# Patient Record
Sex: FEMALE | Race: WHITE | NOT HISPANIC OR LATINO | URBAN - METROPOLITAN AREA
[De-identification: names, ages, dates, MRNs, and addresses within clinical notes are randomized per-mention and may not be internally consistent; named-entity substitution may affect disease eponyms.]

---

## 2018-12-09 ENCOUNTER — INPATIENT (INPATIENT)
Facility: HOSPITAL | Age: 53
LOS: 0 days | Discharge: HOME | DRG: 342 | End: 2018-12-10
Attending: STUDENT IN AN ORGANIZED HEALTH CARE EDUCATION/TRAINING PROGRAM | Admitting: STUDENT IN AN ORGANIZED HEALTH CARE EDUCATION/TRAINING PROGRAM
Payer: COMMERCIAL

## 2018-12-09 ENCOUNTER — TRANSCRIPTION ENCOUNTER (OUTPATIENT)
Age: 53
End: 2018-12-09

## 2018-12-09 VITALS
RESPIRATION RATE: 20 BRPM | SYSTOLIC BLOOD PRESSURE: 160 MMHG | TEMPERATURE: 98 F | DIASTOLIC BLOOD PRESSURE: 96 MMHG | HEIGHT: 67 IN | HEART RATE: 92 BPM | OXYGEN SATURATION: 95 % | WEIGHT: 139.99 LBS

## 2018-12-09 DIAGNOSIS — K37 UNSPECIFIED APPENDICITIS: ICD-10-CM

## 2018-12-09 LAB
ALBUMIN SERPL ELPH-MCNC: 4.6 G/DL — SIGNIFICANT CHANGE UP (ref 3.3–5.2)
ALP SERPL-CCNC: 68 U/L — SIGNIFICANT CHANGE UP (ref 40–120)
ALT FLD-CCNC: 23 U/L — SIGNIFICANT CHANGE UP
ANION GAP SERPL CALC-SCNC: 14 MMOL/L — SIGNIFICANT CHANGE UP (ref 5–17)
APPEARANCE UR: CLEAR — SIGNIFICANT CHANGE UP
AST SERPL-CCNC: 24 U/L — SIGNIFICANT CHANGE UP
BASOPHILS # BLD AUTO: 0 K/UL — SIGNIFICANT CHANGE UP (ref 0–0.2)
BASOPHILS NFR BLD AUTO: 0.2 % — SIGNIFICANT CHANGE UP (ref 0–2)
BILIRUB SERPL-MCNC: 0.4 MG/DL — SIGNIFICANT CHANGE UP (ref 0.4–2)
BILIRUB UR-MCNC: NEGATIVE — SIGNIFICANT CHANGE UP
BUN SERPL-MCNC: 15 MG/DL — SIGNIFICANT CHANGE UP (ref 8–20)
CALCIUM SERPL-MCNC: 9.7 MG/DL — SIGNIFICANT CHANGE UP (ref 8.6–10.2)
CHLORIDE SERPL-SCNC: 99 MMOL/L — SIGNIFICANT CHANGE UP (ref 98–107)
CO2 SERPL-SCNC: 25 MMOL/L — SIGNIFICANT CHANGE UP (ref 22–29)
COLOR SPEC: YELLOW — SIGNIFICANT CHANGE UP
CREAT SERPL-MCNC: 0.46 MG/DL — LOW (ref 0.5–1.3)
DIFF PNL FLD: NEGATIVE — SIGNIFICANT CHANGE UP
EOSINOPHIL # BLD AUTO: 0 K/UL — SIGNIFICANT CHANGE UP (ref 0–0.5)
EOSINOPHIL NFR BLD AUTO: 0 % — SIGNIFICANT CHANGE UP (ref 0–6)
GLUCOSE SERPL-MCNC: 120 MG/DL — HIGH (ref 70–115)
GLUCOSE UR QL: NEGATIVE MG/DL — SIGNIFICANT CHANGE UP
HCT VFR BLD CALC: 41.5 % — SIGNIFICANT CHANGE UP (ref 37–47)
HGB BLD-MCNC: 13.9 G/DL — SIGNIFICANT CHANGE UP (ref 12–16)
KETONES UR-MCNC: ABNORMAL
LEUKOCYTE ESTERASE UR-ACNC: ABNORMAL
LIDOCAIN IGE QN: 38 U/L — SIGNIFICANT CHANGE UP (ref 22–51)
LYMPHOCYTES # BLD AUTO: 1.4 K/UL — SIGNIFICANT CHANGE UP (ref 1–4.8)
LYMPHOCYTES # BLD AUTO: 11.8 % — LOW (ref 20–55)
MCHC RBC-ENTMCNC: 32 PG — HIGH (ref 27–31)
MCHC RBC-ENTMCNC: 33.5 G/DL — SIGNIFICANT CHANGE UP (ref 32–36)
MCV RBC AUTO: 95.6 FL — SIGNIFICANT CHANGE UP (ref 81–99)
MONOCYTES # BLD AUTO: 0.5 K/UL — SIGNIFICANT CHANGE UP (ref 0–0.8)
MONOCYTES NFR BLD AUTO: 3.8 % — SIGNIFICANT CHANGE UP (ref 3–10)
NEUTROPHILS # BLD AUTO: 10.3 K/UL — HIGH (ref 1.8–8)
NEUTROPHILS NFR BLD AUTO: 83.9 % — HIGH (ref 37–73)
NITRITE UR-MCNC: NEGATIVE — SIGNIFICANT CHANGE UP
PH UR: 7 — SIGNIFICANT CHANGE UP (ref 5–8)
PLATELET # BLD AUTO: 242 K/UL — SIGNIFICANT CHANGE UP (ref 150–400)
POTASSIUM SERPL-MCNC: 4.6 MMOL/L — SIGNIFICANT CHANGE UP (ref 3.5–5.3)
POTASSIUM SERPL-SCNC: 4.6 MMOL/L — SIGNIFICANT CHANGE UP (ref 3.5–5.3)
PROT SERPL-MCNC: 7.5 G/DL — SIGNIFICANT CHANGE UP (ref 6.6–8.7)
PROT UR-MCNC: 15 MG/DL
RBC # BLD: 4.34 M/UL — LOW (ref 4.4–5.2)
RBC # FLD: 12 % — SIGNIFICANT CHANGE UP (ref 11–15.6)
SODIUM SERPL-SCNC: 138 MMOL/L — SIGNIFICANT CHANGE UP (ref 135–145)
SP GR SPEC: 1.01 — SIGNIFICANT CHANGE UP (ref 1.01–1.02)
UROBILINOGEN FLD QL: NEGATIVE MG/DL — SIGNIFICANT CHANGE UP
WBC # BLD: 12.3 K/UL — HIGH (ref 4.8–10.8)
WBC # FLD AUTO: 12.3 K/UL — HIGH (ref 4.8–10.8)

## 2018-12-09 PROCEDURE — 76705 ECHO EXAM OF ABDOMEN: CPT | Mod: 26

## 2018-12-09 PROCEDURE — 93010 ELECTROCARDIOGRAM REPORT: CPT

## 2018-12-09 PROCEDURE — 99285 EMERGENCY DEPT VISIT HI MDM: CPT

## 2018-12-09 PROCEDURE — 74177 CT ABD & PELVIS W/CONTRAST: CPT | Mod: 26

## 2018-12-09 RX ORDER — METOCLOPRAMIDE HCL 10 MG
10 TABLET ORAL ONCE
Qty: 0 | Refills: 0 | Status: COMPLETED | OUTPATIENT
Start: 2018-12-09 | End: 2018-12-09

## 2018-12-09 RX ORDER — CEFOTETAN DISODIUM 1 G
2 VIAL (EA) INJECTION ONCE
Qty: 0 | Refills: 0 | Status: COMPLETED | OUTPATIENT
Start: 2018-12-09 | End: 2018-12-09

## 2018-12-09 RX ORDER — RIVAROXABAN 15 MG-20MG
0 KIT ORAL
Qty: 0 | Refills: 0 | COMMUNITY

## 2018-12-09 RX ORDER — ONDANSETRON 8 MG/1
4 TABLET, FILM COATED ORAL ONCE
Qty: 0 | Refills: 0 | Status: COMPLETED | OUTPATIENT
Start: 2018-12-09 | End: 2018-12-09

## 2018-12-09 RX ORDER — HYDROMORPHONE HYDROCHLORIDE 2 MG/ML
1 INJECTION INTRAMUSCULAR; INTRAVENOUS; SUBCUTANEOUS ONCE
Qty: 0 | Refills: 0 | Status: DISCONTINUED | OUTPATIENT
Start: 2018-12-09 | End: 2018-12-09

## 2018-12-09 RX ORDER — SODIUM CHLORIDE 9 MG/ML
1000 INJECTION INTRAMUSCULAR; INTRAVENOUS; SUBCUTANEOUS ONCE
Qty: 0 | Refills: 0 | Status: COMPLETED | OUTPATIENT
Start: 2018-12-09 | End: 2018-12-09

## 2018-12-09 RX ORDER — ACETAMINOPHEN 500 MG
1000 TABLET ORAL ONCE
Qty: 0 | Refills: 0 | Status: COMPLETED | OUTPATIENT
Start: 2018-12-09 | End: 2018-12-09

## 2018-12-09 RX ORDER — KETOROLAC TROMETHAMINE 30 MG/ML
15 SYRINGE (ML) INJECTION ONCE
Qty: 0 | Refills: 0 | Status: DISCONTINUED | OUTPATIENT
Start: 2018-12-09 | End: 2018-12-09

## 2018-12-09 RX ORDER — FAMOTIDINE 10 MG/ML
20 INJECTION INTRAVENOUS ONCE
Qty: 0 | Refills: 0 | Status: COMPLETED | OUTPATIENT
Start: 2018-12-09 | End: 2018-12-09

## 2018-12-09 RX ADMIN — Medication 15 MILLIGRAM(S): at 19:30

## 2018-12-09 RX ADMIN — SODIUM CHLORIDE 1000 MILLILITER(S): 9 INJECTION INTRAMUSCULAR; INTRAVENOUS; SUBCUTANEOUS at 18:02

## 2018-12-09 RX ADMIN — Medication 100 GRAM(S): at 22:51

## 2018-12-09 RX ADMIN — SODIUM CHLORIDE 1000 MILLILITER(S): 9 INJECTION INTRAMUSCULAR; INTRAVENOUS; SUBCUTANEOUS at 19:00

## 2018-12-09 RX ADMIN — HYDROMORPHONE HYDROCHLORIDE 1 MILLIGRAM(S): 2 INJECTION INTRAMUSCULAR; INTRAVENOUS; SUBCUTANEOUS at 23:21

## 2018-12-09 RX ADMIN — ONDANSETRON 4 MILLIGRAM(S): 8 TABLET, FILM COATED ORAL at 20:53

## 2018-12-09 RX ADMIN — ONDANSETRON 4 MILLIGRAM(S): 8 TABLET, FILM COATED ORAL at 18:02

## 2018-12-09 RX ADMIN — Medication 10 MILLIGRAM(S): at 19:33

## 2018-12-09 RX ADMIN — Medication 15 MILLIGRAM(S): at 22:52

## 2018-12-09 RX ADMIN — FAMOTIDINE 20 MILLIGRAM(S): 10 INJECTION INTRAVENOUS at 18:03

## 2018-12-09 RX ADMIN — Medication 400 MILLIGRAM(S): at 23:23

## 2018-12-09 NOTE — H&P ADULT - HISTORY OF PRESENT ILLNESS
54 y/o F h/o Factor V Leiden presents to the ED with abdominal pain that started today. Patient states abdominal pain started in LLQ at around 4PM and migrated to RLQ. Pain is described as severe, sharp and is associated with nausea and multiple episodes of vomiting. No fever, but has chills. No changes in bowel habits. Headache present. Decreased oral intake today. No other symptoms.    PMH: Factor V Leiden  PSH: Paula filter insertion  Medications: Xarleto  Allergies: NKDA, NKA  Social: no tobacco use. Drinks one glass of wine every night. No illicit drug use.

## 2018-12-09 NOTE — ED PROVIDER NOTE - PROGRESS NOTE DETAILS
NP NOTE:  Patient with vomiting, will rx reglan.  US negative, continued pain CT abd/pelvis w IV contrast ordered.  Report and care given to Marsha GORE. Patient states abdominal pain is improved. She is still nauseous but states it is improving slowly. CT results: Acute uncomplicated appendicitis. Spoke to surgery, will see patient in ED.

## 2018-12-09 NOTE — H&P ADULT - ASSESSMENT
54 y/o F h/o Factor V Leiden, DVTs on Xarelto with clinical and radiographic evidence of acute appendicitis. Hemodynamically stable, afebrile. Discussed the elevated bleeding risk to the patient considering her last dose of Xarelto was taken yesterday evening. The benefits of appendectomy and prevention of rupture outweigh the risk of bleeding in this case.    Plan:  Admit to ACS, Dr. Law  OR for laparoscopic, possible open, appendectomy  2 units FFP, 2 units PRBC on hold for OR  NPO/IVF  Pain control as needed  Cefotetan IV  DVT ppx    Patient seen and evaluated with chief resident Dr. New and attending Dr. Law.

## 2018-12-09 NOTE — H&P ADULT - NSHPPHYSICALEXAM_GEN_ALL_CORE
General: NAD, AOx3, uncomfortable on examination  HEENT: PERRLA, EOMI, dry mucous membranes  Neck: supple, nontender  Cardiovascular: heart RRR, no murmurs  Respiratory: no respiratory distress, CTAB  Abdomen: soft, right abdomen tenderness to palpation, nondistended. No guarding or rebound. Normal bowel sounds.  Extremities: no peripheral edema. Normal ROM.  Integumentary: warm, no rash  Neuro: no motor or sensory deficits

## 2018-12-09 NOTE — ED STATDOCS - OBJECTIVE STATEMENT
Telemedicine assessment was conducted (using real time 2 way audio-video technology) by Dr. Juan Ramon Hunt located at [address] [place] [address]  ++++++++++++++++++++++++  Pertinent patient history and initial plan:    54 y/o F pt with hx of factor 5 Leiden on Xarelto presents to ED from [facility] urgent care c/o left sided  abdominal pain associated with nausea and vomiting since yesterday. Pain described as a cramping sensation. Last bowel movement yesterday. Denies diarrhea, fevers. No further complaints at this time. Telemedicine assessment was conducted (using real time 2 way audio-video technology) by Dr. Juan Ramon Hunt located at [address] [place] [address]  ++++++++++++++++++++++++  Pertinent patient history and initial plan:    52 y/o F pt with hx of factor 5 Leiden on Xarelto presents to ED from [facility] urgent care c/o left sided  abdominal pain associated with nausea and vomiting since yesterday. Pain described as a cramping sensation. Last bowel movement yesterday. Denies diarrhea, fevers. No further complaints at this time.    labs, UA, zofran    Patient seen by me in intake for initial assessment and ordering. Physician on site to follow results and further evaluate and treat patient.

## 2018-12-09 NOTE — ED PROVIDER NOTE - OBJECTIVE STATEMENT
52 y/o F sent by  [employer] with c/o sudden onset generalized abdominal pain with N/V since 4am.  The pain is constant and is now radiating to back.  Has not taken anything for the pain. 52 y/o F sent by  [employer] with c/o sudden onset generalized abdominal pain with multiple episodes of N/V since 4am.  The pain is constant and is now radiating to back.  Has not taken anything for the pain.

## 2018-12-09 NOTE — ED PROVIDER NOTE - ATTENDING CONTRIBUTION TO CARE
53 year old c/o sudden onset nausea and vomiting with diffuse abdominal pain that is now in the RUQ radiating to the back.  Patient well appearing with RUQ tenderness on exam.  US negative for cholecystectomy.  CT + for appendicitis.  Patient given abx and admitted to ACS team.

## 2018-12-10 ENCOUNTER — TRANSCRIPTION ENCOUNTER (OUTPATIENT)
Age: 53
End: 2018-12-10

## 2018-12-10 ENCOUNTER — RESULT REVIEW (OUTPATIENT)
Age: 53
End: 2018-12-10

## 2018-12-10 VITALS
TEMPERATURE: 98 F | RESPIRATION RATE: 18 BRPM | DIASTOLIC BLOOD PRESSURE: 64 MMHG | SYSTOLIC BLOOD PRESSURE: 109 MMHG | OXYGEN SATURATION: 95 % | HEART RATE: 82 BPM

## 2018-12-10 LAB
ABO RH CONFIRMATION: SIGNIFICANT CHANGE UP
APTT BLD: 27.3 SEC — LOW (ref 27.5–36.3)
BLD GP AB SCN SERPL QL: SIGNIFICANT CHANGE UP
INR BLD: 1.01 RATIO — SIGNIFICANT CHANGE UP (ref 0.88–1.16)
PROTHROM AB SERPL-ACNC: 11.6 SEC — SIGNIFICANT CHANGE UP (ref 10–12.9)
TYPE + AB SCN PNL BLD: SIGNIFICANT CHANGE UP

## 2018-12-10 PROCEDURE — 88304 TISSUE EXAM BY PATHOLOGIST: CPT | Mod: 26

## 2018-12-10 PROCEDURE — 44970 LAPAROSCOPY APPENDECTOMY: CPT

## 2018-12-10 PROCEDURE — 71045 X-RAY EXAM CHEST 1 VIEW: CPT | Mod: 26

## 2018-12-10 RX ORDER — HYDROMORPHONE HYDROCHLORIDE 2 MG/ML
0.5 INJECTION INTRAMUSCULAR; INTRAVENOUS; SUBCUTANEOUS EVERY 4 HOURS
Qty: 0 | Refills: 0 | Status: DISCONTINUED | OUTPATIENT
Start: 2018-12-10 | End: 2018-12-10

## 2018-12-10 RX ORDER — SODIUM CHLORIDE 9 MG/ML
1000 INJECTION, SOLUTION INTRAVENOUS
Qty: 0 | Refills: 0 | Status: DISCONTINUED | OUTPATIENT
Start: 2018-12-10 | End: 2018-12-10

## 2018-12-10 RX ORDER — ENOXAPARIN SODIUM 100 MG/ML
65 INJECTION SUBCUTANEOUS ONCE
Qty: 0 | Refills: 0 | Status: COMPLETED | OUTPATIENT
Start: 2018-12-10 | End: 2018-12-10

## 2018-12-10 RX ORDER — ACETAMINOPHEN 500 MG
650 TABLET ORAL EVERY 6 HOURS
Qty: 0 | Refills: 0 | Status: DISCONTINUED | OUTPATIENT
Start: 2018-12-10 | End: 2018-12-10

## 2018-12-10 RX ORDER — TRAMADOL HYDROCHLORIDE 50 MG/1
50 TABLET ORAL EVERY 6 HOURS
Qty: 0 | Refills: 0 | Status: DISCONTINUED | OUTPATIENT
Start: 2018-12-10 | End: 2018-12-10

## 2018-12-10 RX ORDER — FENTANYL CITRATE 50 UG/ML
50 INJECTION INTRAVENOUS
Qty: 0 | Refills: 0 | Status: DISCONTINUED | OUTPATIENT
Start: 2018-12-10 | End: 2018-12-10

## 2018-12-10 RX ORDER — OXYCODONE AND ACETAMINOPHEN 5; 325 MG/1; MG/1
1 TABLET ORAL ONCE
Qty: 0 | Refills: 0 | Status: DISCONTINUED | OUTPATIENT
Start: 2018-12-10 | End: 2018-12-10

## 2018-12-10 RX ORDER — ONDANSETRON 8 MG/1
4 TABLET, FILM COATED ORAL ONCE
Qty: 0 | Refills: 0 | Status: DISCONTINUED | OUTPATIENT
Start: 2018-12-10 | End: 2018-12-10

## 2018-12-10 RX ORDER — INFLUENZA VIRUS VACCINE 15; 15; 15; 15 UG/.5ML; UG/.5ML; UG/.5ML; UG/.5ML
0.5 SUSPENSION INTRAMUSCULAR ONCE
Qty: 0 | Refills: 0 | Status: DISCONTINUED | OUTPATIENT
Start: 2018-12-10 | End: 2018-12-10

## 2018-12-10 RX ORDER — ACETAMINOPHEN 500 MG
2 TABLET ORAL
Qty: 0 | Refills: 0 | COMMUNITY
Start: 2018-12-10

## 2018-12-10 RX ORDER — ONDANSETRON 8 MG/1
4 TABLET, FILM COATED ORAL EVERY 6 HOURS
Qty: 0 | Refills: 0 | Status: DISCONTINUED | OUTPATIENT
Start: 2018-12-10 | End: 2018-12-10

## 2018-12-10 RX ORDER — TRAMADOL HYDROCHLORIDE 50 MG/1
1 TABLET ORAL
Qty: 12 | Refills: 0 | OUTPATIENT
Start: 2018-12-10 | End: 2018-12-12

## 2018-12-10 RX ORDER — TRAMADOL HYDROCHLORIDE 50 MG/1
25 TABLET ORAL EVERY 6 HOURS
Qty: 0 | Refills: 0 | Status: DISCONTINUED | OUTPATIENT
Start: 2018-12-10 | End: 2018-12-10

## 2018-12-10 RX ORDER — HYDROMORPHONE HYDROCHLORIDE 2 MG/ML
1 INJECTION INTRAMUSCULAR; INTRAVENOUS; SUBCUTANEOUS EVERY 4 HOURS
Qty: 0 | Refills: 0 | Status: DISCONTINUED | OUTPATIENT
Start: 2018-12-10 | End: 2018-12-10

## 2018-12-10 RX ADMIN — ENOXAPARIN SODIUM 65 MILLIGRAM(S): 100 INJECTION SUBCUTANEOUS at 13:50

## 2018-12-10 RX ADMIN — TRAMADOL HYDROCHLORIDE 25 MILLIGRAM(S): 50 TABLET ORAL at 13:45

## 2018-12-10 RX ADMIN — TRAMADOL HYDROCHLORIDE 25 MILLIGRAM(S): 50 TABLET ORAL at 13:11

## 2018-12-10 NOTE — BRIEF OPERATIVE NOTE - PROCEDURE
<<-----Click on this checkbox to enter Procedure Laparoscopic appendectomy  12/10/2018    Active  GALYATEEM

## 2018-12-10 NOTE — DISCHARGE NOTE ADULT - PATIENT PORTAL LINK FT
You can access the [facility], offered by [facility], by registering with the following website: [email or web address]

## 2018-12-10 NOTE — ED ADULT NURSE NOTE - NSIMPLEMENTINTERV_GEN_ALL_ED
Implemented All Fall with Harm Risk Interventions:  [place] to call system. Call bell, personal items and telephone within reach. Instruct patient to call for assistance. Room bathroom lighting operational. Non-slip footwear when patient is off stretcher. Physically safe environment: no spills, clutter or unnecessary equipment. Stretcher in lowest position, wheels locked, appropriate side rails in place. Provide visual cue, wrist band, yellow gown, etc. Monitor gait and stability. Monitor for mental status changes and reorient to person, place, and time. Review medications for side effects contributing to fall risk. Reinforce activity limits and safety measures with patient and family. Provide visual clues: red socks.

## 2018-12-10 NOTE — DISCHARGE NOTE ADULT - MEDICATION SUMMARY - MEDICATIONS TO TAKE
I will START or STAY ON the medications listed below when I get home from the hospital:    acetaminophen 325 mg oral tablet  -- 2 tab(s) by mouth every 6 hours, As needed, Mild Pain (1 - 3)  -- Indication: For Pain    traMADol 50 mg oral tablet  -- 1 tab(s) by mouth every 6 hours, As Needed -for severe pain MDD:4  -- Caution federal law prohibits the transfer of this drug to any person other  than the person for whom it was prescribed.  May cause drowsiness.  Alcohol may intensify this effect.  Use care when operating dangerous machinery.  Obtain medical advice before taking any non-prescription drugs as some may affect the action of this medication.    -- Indication: For Pain    Xarelto  -- Indication: For Factor V Leiden

## 2018-12-10 NOTE — PROGRESS NOTE ADULT - SUBJECTIVE AND OBJECTIVE BOX
INTERVAL HPI/OVERNIGHT EVENTS: Doing well post-operatively. Pain controlled. No fever, chills, chest pain, or dyspnea. Tolerating diet without nausea or vomiting. No bleeding from incision sites.    MEDICATIONS  (STANDING):  enoxaparin Injectable 65 milliGRAM(s) SubCutaneous once  influenza   Vaccine 0.5 milliLiter(s) IntraMuscular once    MEDICATIONS  (PRN):  acetaminophen   Tablet .. 650 milliGRAM(s) Oral every 6 hours PRN Mild Pain (1 - 3)  ondansetron Injectable 4 milliGRAM(s) IV Push every 6 hours PRN Nausea  traMADol 25 milliGRAM(s) Oral every 6 hours PRN Moderate Pain (4 - 6)  traMADol 50 milliGRAM(s) Oral every 6 hours PRN Severe Pain (7 - 10)      Vital Signs Last 24 Hrs  T(C): 36.8 (10 Dec 2018 08:40), Max: 36.9 (09 Dec 2018 23:26)  T(F): 98.3 (10 Dec 2018 08:40), Max: 98.4 (09 Dec 2018 23:26)  HR: 82 (10 Dec 2018 08:40) (80 - 95)  BP: 109/64 (10 Dec 2018 08:40) (109/64 - 160/96)  BP(mean): --  RR: 18 (10 Dec 2018 08:40) (10 - 20)  SpO2: 95% (10 Dec 2018 08:40) (94% - 100%)    Physical exam:  General: NAD, AOx3, resting comfortably in bed  HEENT: PERRLA, EOMI  Neck: supple, nontender  Respiratory: no respiratory distress, lungs CTAB  Heart: regular rate and rhythm, no murmurs  Abdomen: soft, appropriately tender, nondistended. Normal bowel sounds. No guarding or rebound. Incision sites clean, dry, intact. No bleeding from incisions. Mild ecchymosis at umbilical port site.  Extremities: no peripheral edema. Normal ROM.    I&O's Detail    10 Dec 2018 07:01  -  10 Dec 2018 09:36  --------------------------------------------------------  IN:    Oral Fluid: 475 mL  Total IN: 475 mL    OUT:  Total OUT: 0 mL    Total NET: 475 mL

## 2018-12-10 NOTE — PROGRESS NOTE ADULT - ATTENDING COMMENTS
Tolerating diet. Pain controlled. Voiding.  Abdominal exam with mild yomaira-incisional tenderness.  Plan for dose of lovenox at noon. Patient to resume her xarelto this evening.

## 2018-12-10 NOTE — DISCHARGE NOTE ADULT - CARE PROVIDER_API CALL
[facility],   [facility] [address] - [address] [place] [address]  Phone: [phone]  Fax: (   )    -

## 2018-12-10 NOTE — DISCHARGE NOTE ADULT - PLAN OF CARE
Alleviation of pain and symptoms Follow up: Please call and make an appointment with the Acute Care Surgery Clinic 10-14 days after discharge. Also, please call and make an appointment with your primary care physician as per your usual schedule.     Activity: May return to normal activities as tolerated, however refrain from heavy lifting > 10-15 lbs.    Diet: May continue regular diet.    Medications: Please take all home medications as prescribed by your primary care doctor. Pain medication has been prescribed for you. Please, take it as it has been prescribed, only for severe breakthrough pain, and do not drive or operate heavy machinery while taking narcotics.     Wound Care: Please, keep wound site clean and dry. You may shower, but do not bathe.    Patient is advised to RETURN TO THE EMERGENCY DEPARTMENT for any of the following - worsening pain, fever/chills, nausea/vomiting, altered mental status, chest pain, shortness of breath, or any other new / worsening symptom. Please resume home medications and follow-up with your hematologist / primary care provider as per your usual schedule. Please resume xarelto this evening and follow-up with your hematologist / primary care provider as per your usual schedule.

## 2018-12-10 NOTE — DISCHARGE NOTE ADULT - HOSPITAL COURSE
52 y/o F h/o Factor V Leiden presents to the ED with abdominal pain that started today. Patient states abdominal pain started in LLQ at around 4PM and migrated to RLQ. Pain is described as severe, sharp and is associated with nausea and multiple episodes of vomiting. No fever, but has chills. No changes in bowel habits. Headache present. Decreased oral intake today. No other symptoms.    Hospital Course: CT abd & pelvis on admission showed acute uncomplicated appendicitis. Patient was admitted to the ACS service & taken to the OR on 12/10 for a laparoscopic appendectomy. Procedure completed without complication & patient transferred to PACU and then the floor in stable condition. Post-op patient is hemodynamically well, tolerating diet, pain controlled, OOB ambulating, voiding. Stable for discharge home with outpatient follow-up as outlined above.    Patient is advised to RETURN TO THE EMERGENCY DEPARTMENT for any of the following - worsening pain, fever/chills, nausea/vomiting, altered mental status, chest pain, shortness of breath, or any other new / worsening symptom.

## 2018-12-10 NOTE — ED ADULT NURSE REASSESSMENT NOTE - NS ED NURSE REASSESS COMMENT FT1
patient resting awaitng on OR patient aware of NPO status pre-op check list started sl to right hand #20 intact and flushes well willmonitor andd chart changes

## 2018-12-10 NOTE — BRIEF OPERATIVE NOTE - PRE-OP DX
Acute appendicitis with localized peritonitis, without perforation, abscess, or gangrene  12/10/2018    Active  Quentin Azevedo

## 2018-12-10 NOTE — DISCHARGE NOTE ADULT - CARE PLAN
Principal Discharge DX:	Appendicitis  Goal:	Alleviation of pain and symptoms  Assessment and plan of treatment:	Follow up: Please call and make an appointment with the Acute [facility] Surgery Clinic 10-14 days after discharge. Also, please call and make an appointment with your primary care physician as per your usual schedule.     Activity: May return to normal activities as tolerated, however refrain from heavy lifting > 10-15 lbs.    Diet: May continue regular diet.    Medications: Please take all home medications as prescribed by your primary care doctor. Pain medication has been prescribed for you. Please, take it as it has been prescribed, only for severe breakthrough pain, and do not drive or operate heavy machinery while taking narcotics.     Wound Care: Please, keep wound site clean and dry. You may shower, but do not bathe.    Patient is advised to RETURN TO THE EMERGENCY DEPARTMENT for any of the following - worsening pain, fever/chills, nausea/vomiting, altered mental status, chest pain, shortness of breath, or any other new / worsening symptom.  Secondary Diagnosis:	Factor V Leiden  Assessment and plan of treatment:	Please resume home medications and follow-up with your hematologist / primary care provider as per your usual schedule. Principal Discharge DX:	Appendicitis  Goal:	Alleviation of pain and symptoms  Assessment and plan of treatment:	Follow up: Please call and make an appointment with the [facility] 10-14 days after discharge. Also, please call and make an appointment with your primary care physician as per your usual schedule.     Activity: May return to normal activities as tolerated, however refrain from heavy lifting > 10-15 lbs.    Diet: May continue regular diet.    Medications: Please take all home medications as prescribed by your primary care doctor. Pain medication has been prescribed for you. Please, take it as it has been prescribed, only for severe breakthrough pain, and do not drive or operate heavy machinery while taking narcotics.     Wound Care: Please, keep wound site clean and dry. You may shower, but do not bathe.    Patient is advised to RETURN TO THE EMERGENCY DEPARTMENT for any of the following - worsening pain, fever/chills, nausea/vomiting, altered mental status, chest pain, shortness of breath, or any other new / worsening symptom.  Secondary Diagnosis:	Factor V Leiden  Assessment and plan of treatment:	Please resume xarelto this evening and follow-up with your hematologist / primary care provider as per your usual schedule.

## 2018-12-10 NOTE — DISCHARGE NOTE ADULT - PROVIDER TOKENS
FREE:[LAST:[[facility]],PHONE:[phone]],FAX:[(   [phone]    -],ADDRESS:[[address] [place] [address]]]

## 2018-12-10 NOTE — PROGRESS NOTE ADULT - ASSESSMENT
54 y/o F h/o Factor V Leiden POD 0 s/p laparoscopic appendectomy. Doing well post-operatively. Hemodynamically stable, afebrile.     Plan:  Regular diet  Pain control  Therapeutic lovenox x1 dose at noon, then will restart home Xarelto this evening  Dispo: plan for discharge home today

## 2018-12-11 LAB — SURGICAL PATHOLOGY FINAL REPORT - CH: SIGNIFICANT CHANGE UP

## 2018-12-17 PROBLEM — I82.409 ACUTE EMBOLISM AND THROMBOSIS OF UNSPECIFIED DEEP VEINS OF UNSPECIFIED LOWER EXTREMITY: Chronic | Status: ACTIVE | Noted: 2018-12-09

## 2018-12-17 PROBLEM — D68.51 ACTIVATED PROTEIN C RESISTANCE: Chronic | Status: ACTIVE | Noted: 2018-12-09

## 2018-12-21 PROBLEM — Z00.00 ENCOUNTER FOR PREVENTIVE HEALTH EXAMINATION: Status: ACTIVE | Noted: 2018-12-21

## 2018-12-27 ENCOUNTER — APPOINTMENT (OUTPATIENT)
Dept: TRAUMA SURGERY | Facility: CLINIC | Age: 53
End: 2018-12-27
Payer: COMMERCIAL

## 2018-12-27 VITALS
BODY MASS INDEX: 23.39 KG/M2 | DIASTOLIC BLOOD PRESSURE: 88 MMHG | WEIGHT: 149 LBS | OXYGEN SATURATION: 96 % | SYSTOLIC BLOOD PRESSURE: 153 MMHG | HEART RATE: 79 BPM | TEMPERATURE: 98.8 F | HEIGHT: 67 IN

## 2018-12-27 PROCEDURE — 99024 POSTOP FOLLOW-UP VISIT: CPT

## 2019-07-10 PROCEDURE — 85730 THROMBOPLASTIN TIME PARTIAL: CPT

## 2019-07-10 PROCEDURE — 86900 BLOOD TYPING SEROLOGIC ABO: CPT

## 2019-07-10 PROCEDURE — 80053 COMPREHEN METABOLIC PANEL: CPT

## 2019-07-10 PROCEDURE — 76705 ECHO EXAM OF ABDOMEN: CPT

## 2019-07-10 PROCEDURE — 36415 COLL VENOUS BLD VENIPUNCTURE: CPT

## 2019-07-10 PROCEDURE — 96361 HYDRATE IV INFUSION ADD-ON: CPT

## 2019-07-10 PROCEDURE — 85027 COMPLETE CBC AUTOMATED: CPT

## 2019-07-10 PROCEDURE — 86850 RBC ANTIBODY SCREEN: CPT

## 2019-07-10 PROCEDURE — 86901 BLOOD TYPING SEROLOGIC RH(D): CPT

## 2019-07-10 PROCEDURE — 85610 PROTHROMBIN TIME: CPT

## 2019-07-10 PROCEDURE — 96374 THER/PROPH/DIAG INJ IV PUSH: CPT | Mod: XU

## 2019-07-10 PROCEDURE — 96375 TX/PRO/DX INJ NEW DRUG ADDON: CPT

## 2019-07-10 PROCEDURE — 86923 COMPATIBILITY TEST ELECTRIC: CPT

## 2019-07-10 PROCEDURE — 93005 ELECTROCARDIOGRAM TRACING: CPT

## 2019-07-10 PROCEDURE — 81001 URINALYSIS AUTO W/SCOPE: CPT

## 2019-07-10 PROCEDURE — 99285 EMERGENCY DEPT VISIT HI MDM: CPT | Mod: 25

## 2019-07-10 PROCEDURE — 83690 ASSAY OF LIPASE: CPT

## 2019-07-10 PROCEDURE — 74177 CT ABD & PELVIS W/CONTRAST: CPT

## 2019-07-10 PROCEDURE — 88304 TISSUE EXAM BY PATHOLOGIST: CPT

## 2019-07-10 PROCEDURE — 96376 TX/PRO/DX INJ SAME DRUG ADON: CPT

## 2019-07-10 PROCEDURE — 71045 X-RAY EXAM CHEST 1 VIEW: CPT

## 2019-12-10 ENCOUNTER — RESULT REVIEW (OUTPATIENT)
Age: 54
End: 2019-12-10

## 2020-02-24 ENCOUNTER — TRANSCRIPTION ENCOUNTER (OUTPATIENT)
Age: 55
End: 2020-02-24

## 2020-12-14 ENCOUNTER — TRANSCRIPTION ENCOUNTER (OUTPATIENT)
Age: 55
End: 2020-12-14

## 2021-02-08 ENCOUNTER — TRANSCRIPTION ENCOUNTER (OUTPATIENT)
Age: 56
End: 2021-02-08

## 2021-03-03 ENCOUNTER — RESULT REVIEW (OUTPATIENT)
Age: 56
End: 2021-03-03

## 2021-05-03 NOTE — H&P ADULT - NSHPROSALLOTHERNEGRD_GEN_ALL_CORE
All other review of systems negative, except as noted in HPI Purse String (Intermediate) Text: Given the location of the defect and the characteristics of the surrounding skin a purse string intermediate closure was deemed most appropriate.  Undermining was performed circumfirentially around the surgical defect.  A purse string suture was then placed and tightened.

## 2021-05-07 NOTE — ED ADULT NURSE NOTE - CAS TRG GENERAL AIRWAY, MLM
[FreeTextEntry1] : The patient is a 46-year-old  still premenopausal white female of French, English, swished, Iraqi, [place], Luxembourgish, and  descent.  She underwent menarche at age 13 and had her first child at age 33.  She has a strong family history of breast and ovarian cancer with her maternal great aunt who had ovarian cancer who passed away.  She has a maternal second cousin who had breast cancer in her late 40s.  Her paternal grandfather passed away from colorectal cancer at age 65.  Her maternal grandfather had colon cancer around age 75.  She has 4 maternal great uncles who had esophageal and throat cancer and a paternal great aunt who had uterine/cervical cancer.  A paternal great uncle had lung cancer.  The patient underwent bilateral augmentation implants in  requiring multiple replacements for ruptures last exchanged in .  She noticed a density towards the lateral aspect of the left breast and mammography showed suspicious left retroareolar calcifications.  Ultrasound showed multiple suspicious areas in the left breast 2:00, 3:00, as well as a suspicious left axillary lymph node.  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  A separate left breast 2:00 density 5 cm from the nipple turned out to be metastatic cancer and an intramammary lymph node which was ER/AL positive and HER-2 positive.  The left axillary suspicious lymph node was also positive.  She had clips placed on all the biopsied lesions as well as in a separate left breast 2:00 and 9:00 density which turned out to be a fibroadenoma and fibrocystic change.  An MRI performed on 2017 showed the right breast to be negative but the left breast showed extensive enhancement and PET scan showed no distant disease.  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  She underwent neoadjuvant TCHP chemotherapy through Dr. Pacheco.  MRI after neoadjuvant chemotherapy on 2018 showed no further enhancement.  I then performed bilateral total mastectomies to a Wise reduction pattern incision on April 10, 2018 and she had bilateral expander reconstructions with AlloDerm.  She had a sentinel lymph node biopsy with localization of the nodes preoperatively and both the intramammary as well as axillary lymph nodes were all negative.  She ended up with another 12 nodes removed which were negative since the localization was not perfectly placed.  The left mastectomy showed no residual cancer in the right mastectomy was negative.  She did undergo a small wound revision of the left breast by Dr. Nava on May 23, 2018.  She is following up with Dr. Pacheco and finished Herceptin and Perjeta and underwent postmastectomy radiation therapy at [facility] which finished on 2018.  She developed some redness in the left breast and was admitted to [facility] and placed on IV vancomycin for 3 days and the redness resolved.  She developed bilateral upper extremity lymphedema for which she wears compression sleeves.  She developed redness over the left implant and was placed on Levaquin in 2018 and the redness improved.  She again was placed on Cipro in 2019 for some cellulitis over the right implant.  She underwent fat grafting and removal of her port in 2019 and then had her implants removed and exchanged by Dr. Ye in 2019 for cosmesis.  She now follows up with Dr. Holly Walker and is continuing on Zoladex as well as Aromasin and is finishing out neratinib.  She did have some neuropathy and follows up with Dr. Stephen.  On exam, I cannot feel any suspicious densities over either implant.  She has stable bilateral arm lymphedema.  There was a small density felt over the upper inner aspect of the left implant and I sent her for directed left breast ultrasound which was performed on 3/22/2021 showing no suspicious findings in that area.  Her redness has since improved and the density is no longer palpable.  Does have this soft palpable lymph node in the upper right axillary region I would like her to get a directed ultrasound but this is likely benign and can be followed.  She did have her 2nd COVID vaccination in 2021.  She was reassured and can just follow-up again in 6 months as long as his right axillary ultrasound shows no suspicious findings.  She can give me a call back if she notices any worsening redness or return of any mass. Otherwise she should remain on Zoladex, Aromasin, and neratinib through Dr. Walker.
Patent

## 2021-06-18 NOTE — BRIEF OPERATIVE NOTE - DISPOSITION
If you are a smoker, it is important for your health to stop smoking. Please be aware that second hand smoke is also harmful. PACU to floor

## 2023-07-31 NOTE — BRIEF OPERATIVE NOTE - TYPE OF ANESTHESIA
Refill request for Prevastatin  Metformin    Last office visit date: 4/7/2023    Next appointment scheduled?: Yes     Number of refills given 0  
General

## 2023-09-27 NOTE — ED PROVIDER NOTE - CROS ED NEURO NEG
Detail Level: Detailed
Detail Level: Simple
Patient Specific Counseling (Will Not Stick From Patient To Patient): >>\\nDiscussed Efudex in detail\\nMight consider initiating this in the future
no headache

## 2024-05-27 ENCOUNTER — NON-APPOINTMENT (OUTPATIENT)
Age: 59
End: 2024-05-27

## 2025-02-01 ENCOUNTER — NON-APPOINTMENT (OUTPATIENT)
Age: 60
End: 2025-02-01

## 2025-02-05 ENCOUNTER — NON-APPOINTMENT (OUTPATIENT)
Age: 60
End: 2025-02-05